# Patient Record
Sex: FEMALE | Race: WHITE
[De-identification: names, ages, dates, MRNs, and addresses within clinical notes are randomized per-mention and may not be internally consistent; named-entity substitution may affect disease eponyms.]

---

## 2020-11-12 ENCOUNTER — HOSPITAL ENCOUNTER (EMERGENCY)
Dept: HOSPITAL 96 - M.ERS | Age: 65
Discharge: HOME | End: 2020-11-12
Payer: COMMERCIAL

## 2020-11-12 VITALS — WEIGHT: 127.01 LBS | BODY MASS INDEX: 24.94 KG/M2 | HEIGHT: 60 IN

## 2020-11-12 VITALS — SYSTOLIC BLOOD PRESSURE: 154 MMHG | DIASTOLIC BLOOD PRESSURE: 70 MMHG

## 2020-11-12 DIAGNOSIS — Z86.73: ICD-10-CM

## 2020-11-12 DIAGNOSIS — R10.11: Primary | ICD-10-CM

## 2020-11-12 DIAGNOSIS — Z79.899: ICD-10-CM

## 2020-11-12 DIAGNOSIS — F17.210: ICD-10-CM

## 2020-11-12 DIAGNOSIS — Z88.6: ICD-10-CM

## 2020-11-12 DIAGNOSIS — I25.2: ICD-10-CM

## 2020-11-12 DIAGNOSIS — Z79.82: ICD-10-CM

## 2020-11-12 DIAGNOSIS — J44.9: ICD-10-CM

## 2020-11-12 LAB
ABSOLUTE BASOPHILS: 0.1 THOU/UL (ref 0–0.2)
ABSOLUTE EOSINOPHILS: 0.1 THOU/UL (ref 0–0.7)
ABSOLUTE MONOCYTES: 0.6 THOU/UL (ref 0–1.2)
ALBUMIN SERPL-MCNC: 3.4 G/DL (ref 3.4–5)
ALP SERPL-CCNC: 43 U/L (ref 46–116)
ALT SERPL-CCNC: 22 U/L (ref 30–65)
ANION GAP SERPL CALC-SCNC: 7 MMOL/L (ref 7–16)
AST SERPL-CCNC: 27 U/L (ref 15–37)
BASOPHILS NFR BLD AUTO: 1.2 %
BILIRUB SERPL-MCNC: 0.3 MG/DL
BILIRUB UR-MCNC: NEGATIVE MG/DL
BUN SERPL-MCNC: 24 MG/DL (ref 7–18)
CALCIUM SERPL-MCNC: 8.5 MG/DL (ref 8.5–10.1)
CHLORIDE SERPL-SCNC: 102 MMOL/L (ref 98–107)
CO2 SERPL-SCNC: 28 MMOL/L (ref 21–32)
COLOR UR: YELLOW
CREAT SERPL-MCNC: 1.1 MG/DL (ref 0.6–1.3)
EOSINOPHIL NFR BLD: 1.8 %
GLUCOSE SERPL-MCNC: 87 MG/DL (ref 70–99)
GRANULOCYTES NFR BLD MANUAL: 73.2 %
HCT VFR BLD CALC: 39.4 % (ref 37–47)
HGB BLD-MCNC: 13 GM/DL (ref 12–15)
KETONES UR STRIP-MCNC: NEGATIVE MG/DL
LIPASE: 186 U/L (ref 73–393)
LYMPHOCYTES # BLD: 1 THOU/UL (ref 0.8–5.3)
LYMPHOCYTES NFR BLD AUTO: 15.3 %
MCH RBC QN AUTO: 29.9 PG (ref 26–34)
MCHC RBC AUTO-ENTMCNC: 33.1 G/DL (ref 28–37)
MCV RBC: 90.2 FL (ref 80–100)
MONOCYTES NFR BLD: 8.5 %
MPV: 9.1 FL. (ref 7.2–11.1)
NEUTROPHILS # BLD: 5 THOU/UL (ref 1.6–8.1)
NUCLEATED RBCS: 0 /100WBC
PLATELET COUNT*: 281 THOU/UL (ref 150–400)
POTASSIUM SERPL-SCNC: 4.2 MMOL/L (ref 3.5–5.1)
PROT SERPL-MCNC: 7.3 G/DL (ref 6.4–8.2)
PROT UR QL STRIP: NEGATIVE
RBC # BLD AUTO: 4.36 MIL/UL (ref 4.2–5)
RBC # UR STRIP: NEGATIVE /UL
RDW-CV: 15.1 % (ref 10.5–14.5)
SODIUM SERPL-SCNC: 137 MMOL/L (ref 136–145)
SP GR UR STRIP: >= 1.03 (ref 1–1.03)
URINE CLARITY: CLEAR
URINE GLUCOSE-RANDOM: NEGATIVE
URINE LEUKOCYTES-REFLEX: NEGATIVE
URINE NITRITE-REFLEX: NEGATIVE
UROBILINOGEN UR STRIP-ACNC: 0.2 E.U./DL (ref 0.2–1)
WBC # BLD AUTO: 6.8 THOU/UL (ref 4–11)

## 2020-11-13 NOTE — EKG
[place] [address]
Phone:  [phone]                     ELECTROCARDIOGRAM REPORT      
_______________________________________________________________________________
 
Name:         MENDOSADONNIE             Room:                     [facility]#:    [id number]     Account #:     [id number]  
Admission:    20    Attend Phys:                     
Discharge:    20    Date of Birth: [birth year]  
Date of Service: 20 1614  Report #:      [id number]
        [id number]
_______________________________________________________________________________
THIS REPORT FOR:  //name//                      
 
                         [facility] ED
                                       
Test Date:    2020               Test Time:    16:14:17
Pat Name:     DONNIE MENDOSA          Department:   
Patient ID:   [id number]            Room:          
Gender:                               Technician:   [facility]
:          1955               Requested By: Tre Ayala
Order Number: [id number] MD:   Ghassan Wick
                                 Measurements
Intervals                              Axis          
Rate:         64                       P:            63
IL:           130                      QRS:          12
QRSD:         95                       T:            -28
QT:           432                                    
QTc:          446                                    
                           Interpretive Statements
Sinus rhythm
Abnormal R-wave progression, early transition
Nonspecific T abnormalities, inferior leads
Baseline wander in lead(s) I,III,aVL,aVF,V1,V5
No previous ECG available for comparison
Electronically Signed On 2020 9:18:24 CST by Ghassan Wick
[email or web address]
 
 
 
 
 
 
 
 
 
 
 
 
 
 
 
 
 
 
 
  <ELECTRONICALLY SIGNED>
                                           By: Ghassan Wick MD, FACC     
  20     0918
D: 20 1614   _____________________________________
T: 20 1614   Ghassan Wick MD, FAC       /EPI

## 2020-12-31 ENCOUNTER — HOSPITAL ENCOUNTER (EMERGENCY)
Dept: HOSPITAL 96 - M.ERS | Age: 65
Discharge: HOME | End: 2020-12-31
Payer: COMMERCIAL

## 2020-12-31 VITALS — SYSTOLIC BLOOD PRESSURE: 100 MMHG | DIASTOLIC BLOOD PRESSURE: 65 MMHG

## 2020-12-31 VITALS — WEIGHT: 103.99 LBS | HEIGHT: 60 IN | BODY MASS INDEX: 20.42 KG/M2
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
## 2020-12-31 DIAGNOSIS — I25.2: ICD-10-CM
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
## 2020-12-31 DIAGNOSIS — Z88.8: ICD-10-CM

## 2020-12-31 DIAGNOSIS — J44.9: ICD-10-CM

## 2020-12-31 DIAGNOSIS — Z85.118: ICD-10-CM

## 2020-12-31 DIAGNOSIS — R51.9: ICD-10-CM

## 2020-12-31 DIAGNOSIS — Z79.899: ICD-10-CM

## 2020-12-31 DIAGNOSIS — S16.1XXA: Primary | ICD-10-CM

## 2020-12-31 DIAGNOSIS — Z86.73: ICD-10-CM

## 2020-12-31 DIAGNOSIS — F17.210: ICD-10-CM
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
## 2020-12-31 DIAGNOSIS — M54.5: ICD-10-CM

## 2021-01-18 ENCOUNTER — HOSPITAL ENCOUNTER (EMERGENCY)
Dept: HOSPITAL 96 - M.ERS | Age: 66
Discharge: HOME | End: 2021-01-18
Payer: COMMERCIAL

## 2021-01-18 VITALS — DIASTOLIC BLOOD PRESSURE: 57 MMHG | SYSTOLIC BLOOD PRESSURE: 122 MMHG

## 2021-01-18 VITALS — HEIGHT: 60 IN | WEIGHT: 124.01 LBS | BODY MASS INDEX: 24.35 KG/M2

## 2021-01-18 DIAGNOSIS — Z79.899: ICD-10-CM

## 2021-01-18 DIAGNOSIS — Z79.82: ICD-10-CM

## 2021-01-18 DIAGNOSIS — I25.2: ICD-10-CM

## 2021-01-18 DIAGNOSIS — Z79.01: ICD-10-CM

## 2021-01-18 DIAGNOSIS — Z88.5: ICD-10-CM

## 2021-01-18 DIAGNOSIS — B34.9: Primary | ICD-10-CM

## 2021-01-18 DIAGNOSIS — J44.9: ICD-10-CM
